# Patient Record
Sex: MALE | Race: OTHER
[De-identification: names, ages, dates, MRNs, and addresses within clinical notes are randomized per-mention and may not be internally consistent; named-entity substitution may affect disease eponyms.]

---

## 2018-08-21 ENCOUNTER — TRANSCRIPTION ENCOUNTER (OUTPATIENT)
Age: 38
End: 2018-08-21

## 2018-08-24 ENCOUNTER — EMERGENCY (EMERGENCY)
Facility: HOSPITAL | Age: 38
LOS: 1 days | Discharge: HOME | End: 2018-08-24
Attending: EMERGENCY MEDICINE
Payer: COMMERCIAL

## 2018-08-24 VITALS
WEIGHT: 199.96 LBS | RESPIRATION RATE: 18 BRPM | HEIGHT: 71 IN | TEMPERATURE: 98 F | SYSTOLIC BLOOD PRESSURE: 120 MMHG | HEART RATE: 64 BPM | OXYGEN SATURATION: 97 % | DIASTOLIC BLOOD PRESSURE: 77 MMHG

## 2018-08-24 PROCEDURE — 99283 EMERGENCY DEPT VISIT LOW MDM: CPT

## 2018-08-24 RX ORDER — AMOXICILLIN 250 MG/5ML
1 SUSPENSION, RECONSTITUTED, ORAL (ML) ORAL
Qty: 10 | Refills: 0 | OUTPATIENT
Start: 2018-08-24 | End: 2018-08-28

## 2018-08-24 RX ORDER — AMOXICILLIN 250 MG/5ML
500 SUSPENSION, RECONSTITUTED, ORAL (ML) ORAL ONCE
Qty: 0 | Refills: 0 | Status: COMPLETED | OUTPATIENT
Start: 2018-08-24 | End: 2018-08-24

## 2018-08-24 RX ADMIN — Medication 500 MILLIGRAM(S): at 13:32

## 2018-08-24 NOTE — ED ADULT TRIAGE NOTE - MODE OF ARRIVAL
11/17/2017  Tomeka Hdez    PROCEDURE PERFORMED:   Colonoscopy with snare polypectomy.     INDICATION FOR PROCEDURE:   History of colon polyps with a previous large adenomatous ascending colon polyp removed by piecemeal snare polypectomy for follow-up    PROCEDURAL DIAGNOSES:   1. Ascending colon polyp 7 mm removed by snare polypectomy  2. Transverse colon polyp sessile 7 mm removed by snare polypectomy  3. Sigmoid diverticulosis.   4. Rectal polyp 5 mm removed by snare polypectomy    MEDICATIONS:   as per Anesthesia    DESCRIPTION OF PROCEDURE:   After risks, benefits and alternatives of procedure, informed consent was obtained. Digital rectal exam was performed and did not reveal any significant abnormalities. The pediatric colonoscope was introduced to the anus and advanced through a fair prep to the cecum. The cecum was identified by ileocecal valve and appendiceal orifice. Instrument was withdrawn as the mucosa was examined. Withdrawal time was 9 minutes.      FINDINGS:   Ascending colon polyp 7 mm removed by snare polypectomy. Transverse colon polyp sessile 7 mm removed by snare polypectomy. Rectal polyp 5 mm removed by snare polypectomy. Cecum, descending and sigmoid colon were normal. There were scattered sigmoid diverticula. Retroflexion in the rectum revealed internal hemorrhoids. Scope was withdrawn, procedure was terminated.     COMPLICATIONS:   None.     ENDOSCOPIC IMPRESSION:   1. Colon polyps removed as above  2. Sigmoid diverticulosis   3. Internal hemorrhoids    RECOMMENDATION:   1. Follow up pathology.   2. High fiber diet, avoid constipation.   3. Repeat surveillance colonoscopy in 3 years depending on the pathology.     Thank you very much for allowing me to participate in the management of this patient.       
Walk in

## 2018-08-24 NOTE — ED PROVIDER NOTE - NS ED ROS FT
CONST: no fevers, no chills  EYES: negative  ENT: +L ear increased yellow discharge, persistent pain and reduced hearing  CV: no chest pain  RESP: no shortness of breath  ABD: no abdominal pain   : no dysuria  MSK: no back pain  NEURO: no headache or additional neurologic complaints  HEME: no easy bleeding  SKIN:  no rash
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
## 2018-08-24 NOTE — ED PROVIDER NOTE - NEUROLOGICAL, MLM
Alert and oriented, no focal deficits, no motor or sensory deficits. +reduced hearing on L CN2-12 otherwise grossly intact.

## 2018-08-24 NOTE — ED PROVIDER NOTE - LEFT EAR
TM PERFORATIONS/TM RED/3-9 oclock perf inferiorly. external canal is mildly erythematous. No tragal/external ear tenderness. No purulent discharge from ear.

## 2018-08-24 NOTE — ED ADULT NURSE NOTE - NSIMPLEMENTINTERV_GEN_ALL_ED
Implemented All Universal Safety Interventions:  [place] to call system. Call bell, personal items and telephone within reach. Instruct patient to call for assistance. Room bathroom lighting operational. Non-slip footwear when patient is off stretcher. Physically safe environment: no spills, clutter or unnecessary equipment. Stretcher in lowest position, wheels locked, appropriate side rails in place.
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
## 2018-08-24 NOTE — ED PROVIDER NOTE - MEDICAL DECISION MAKING DETAILS
38M hx perforated eardrum p/w L ear drainage. Does not appear to be infected. Will give amox for prophylaxis and recommend outpatient ENT f/u. VSS and well appearing. will analgese and d/c home. 38M hx perforated eardrum p/w L ear drainage. Does not appear to be infected. Will give amox for prophylaxis and recommend outpatient ENT f/u. VSS and well appearing. will analgese and d/c home.  Attending Statement: Agree with the above.  L ear TM rupture c reported yellow d/c despite ciprodex gtt.  Rec start amox and ENT f/u.  No evidence of systemic disease or mastoiditis, meningitis, CVST, etc.  --BMM

## 2020-04-25 ENCOUNTER — MESSAGE (OUTPATIENT)
Age: 40
End: 2020-04-25

## 2020-05-02 LAB
SARS-COV-2 IGG SERPL IA-ACNC: 7.1 INDEX
SARS-COV-2 IGG SERPL QL IA: POSITIVE

## 2021-01-22 ENCOUNTER — APPOINTMENT (OUTPATIENT)
Dept: INTERNAL MEDICINE | Facility: CLINIC | Age: 41
End: 2021-01-22
Payer: COMMERCIAL

## 2021-01-22 ENCOUNTER — NON-APPOINTMENT (OUTPATIENT)
Age: 41
End: 2021-01-22

## 2021-01-22 VITALS
BODY MASS INDEX: 29.49 KG/M2 | RESPIRATION RATE: 14 BRPM | TEMPERATURE: 97.6 F | SYSTOLIC BLOOD PRESSURE: 118 MMHG | HEIGHT: 70 IN | WEIGHT: 206 LBS | HEART RATE: 72 BPM | DIASTOLIC BLOOD PRESSURE: 74 MMHG | OXYGEN SATURATION: 96 %

## 2021-01-22 DIAGNOSIS — Z80.3 FAMILY HISTORY OF MALIGNANT NEOPLASM OF BREAST: ICD-10-CM

## 2021-01-22 DIAGNOSIS — Z23 ENCOUNTER FOR IMMUNIZATION: ICD-10-CM

## 2021-01-22 DIAGNOSIS — Z00.00 ENCOUNTER FOR GENERAL ADULT MEDICAL EXAMINATION W/OUT ABNORMAL FINDINGS: ICD-10-CM

## 2021-01-22 DIAGNOSIS — R19.4 CHANGE IN BOWEL HABIT: ICD-10-CM

## 2021-01-22 DIAGNOSIS — N40.0 BENIGN PROSTATIC HYPERPLASIA WITHOUT LOWER URINARY TRACT SYMPMS: ICD-10-CM

## 2021-01-22 DIAGNOSIS — R97.20 BENIGN PROSTATIC HYPERPLASIA WITHOUT LOWER URINARY TRACT SYMPMS: ICD-10-CM

## 2021-01-22 DIAGNOSIS — E78.5 HYPERLIPIDEMIA, UNSPECIFIED: ICD-10-CM

## 2021-01-22 DIAGNOSIS — Z83.3 FAMILY HISTORY OF DIABETES MELLITUS: ICD-10-CM

## 2021-01-22 PROCEDURE — G0442 ANNUAL ALCOHOL SCREEN 15 MIN: CPT | Mod: NC

## 2021-01-22 PROCEDURE — 99386 PREV VISIT NEW AGE 40-64: CPT | Mod: 25

## 2021-01-22 PROCEDURE — G0444 DEPRESSION SCREEN ANNUAL: CPT | Mod: NC,59

## 2021-01-22 PROCEDURE — 36415 COLL VENOUS BLD VENIPUNCTURE: CPT

## 2021-01-22 PROCEDURE — 99072 ADDL SUPL MATRL&STAF TM PHE: CPT

## 2021-01-22 PROCEDURE — 93000 ELECTROCARDIOGRAM COMPLETE: CPT | Mod: 59

## 2021-01-22 RX ORDER — MULTIVIT-MIN/FA/LYCOPEN/LUTEIN .4-300-25
TABLET ORAL
Refills: 0 | Status: ACTIVE | COMMUNITY
Start: 2021-01-22

## 2021-01-22 RX ORDER — OMEGA-3-ACID ETHYL ESTERS CAPSULES 1 G/1
1 CAPSULE, LIQUID FILLED ORAL
Refills: 0 | Status: ACTIVE | COMMUNITY
Start: 2021-01-22

## 2021-01-22 RX ORDER — GLUCOSAMINE SULFATE 500 MG
500 CAPSULE ORAL
Refills: 0 | Status: ACTIVE | COMMUNITY
Start: 2021-01-22

## 2021-01-22 NOTE — HISTORY OF PRESENT ILLNESS
[FreeTextEntry1] : annual well visit  [de-identified] : -PMH: None known\par -SH: .  with Syl. Former smoker (Quit 2008). Occasional EtOH use. \par \par KEEGAN is a 40 year M whom is here today for an annual well check and to establish care w/ a new PMD\par Today, pt reports feeling well but reports that he has been having changes in bowel habits as well as changes in vision that has occurred over the past 2 years. He has an upcoming Optho appointment in 2 weeks\par He denies any changes since our last f/u visit\par with regards to changes in bowel habits, he mentions that he had changes\par \par Specialists Involved:\par None\par \par -Vaccines: All up to date\par -FH of Prostate, Colon, breast or ovarian CA: None known

## 2021-01-22 NOTE — ASSESSMENT
[FreeTextEntry1] : -PMH: None known\par -SH: . [place] with Syl. Former smoker (Quit 2008). Occasional EtOH use. \par \par KEEGAN is a 40 year M whom is here today for an annual well check and to establish care w/ a new PMD\par \par Specialists Involved:\par None\par \par EKG obtained in office today demonstrates NSR. normal axis/Intervals. Good-R-wave progression. Normal EKG. \par \par -F/u labs drawn in office today\par -Further recs pending lab results\par -RTO yearly for CPE or sooner if needed

## 2021-01-22 NOTE — HEALTH RISK ASSESSMENT
[Reviewed no changes] : Reviewed no changes [Very Good] : ~his/her~  mood as very good [6-10] : 6-10 [Yes] : Yes [2 - 4 times a month (2 pts)] : 2-4 times a month (2 points) [1 or 2 (0 pts)] : 1 or 2 (0 points) [Never (0 pts)] : Never (0 points) [No] : In the past 12 months have you used drugs other than those required for medical reasons? No [0] : 2) Feeling down, depressed, or hopeless: Not at all (0) [HIV test declined] : HIV test declined [Hepatitis C test declined] : Hepatitis C test declined [With Family] : lives with family [] :  [# Of Children ___] : has [unfilled] children [] : No [YearQuit] : 2008 [Audit-CScore] : 1 [[id number]] : 0 [Change in mental status noted] : No change in mental status noted [Sexually Active] : not sexually active [AdvancecareDate] : 01/21

## 2021-01-25 LAB
ALBUMIN SERPL ELPH-MCNC: 4.7 G/DL
ALP BLD-CCNC: 54 U/L
ALT SERPL-CCNC: 22 U/L
ANION GAP SERPL CALC-SCNC: 14 MMOL/L
AST SERPL-CCNC: 29 U/L
BASOPHILS # BLD AUTO: 0.04 K/UL
BASOPHILS NFR BLD AUTO: 0.4 %
BILIRUB SERPL-MCNC: 1.2 MG/DL
BUN SERPL-MCNC: 17 MG/DL
CALCIUM SERPL-MCNC: 9.1 MG/DL
CHLORIDE SERPL-SCNC: 101 MMOL/L
CHOLEST SERPL-MCNC: 157 MG/DL
CO2 SERPL-SCNC: 24 MMOL/L
CREAT SERPL-MCNC: 1.21 MG/DL
EOSINOPHIL # BLD AUTO: 0.15 K/UL
EOSINOPHIL NFR BLD AUTO: 1.5 %
GLUCOSE SERPL-MCNC: 95 MG/DL
HCT VFR BLD CALC: 46.2 %
HDLC SERPL-MCNC: 36 MG/DL
HGB BLD-MCNC: 15.4 G/DL
IMM GRANULOCYTES NFR BLD AUTO: 0.5 %
LDLC SERPL CALC-MCNC: 99 MG/DL
LYMPHOCYTES # BLD AUTO: 3.65 K/UL
LYMPHOCYTES NFR BLD AUTO: 35.4 %
MAN DIFF?: NORMAL
MCHC RBC-ENTMCNC: 30.4 PG
MCHC RBC-ENTMCNC: 33.3 GM/DL
MCV RBC AUTO: 91.3 FL
MONOCYTES # BLD AUTO: 0.63 K/UL
MONOCYTES NFR BLD AUTO: 6.1 %
NEUTROPHILS # BLD AUTO: 5.8 K/UL
NEUTROPHILS NFR BLD AUTO: 56.1 %
NONHDLC SERPL-MCNC: 121 MG/DL
PLATELET # BLD AUTO: 272 K/UL
POTASSIUM SERPL-SCNC: 4.1 MMOL/L
PROT SERPL-MCNC: 7.3 G/DL
PSA SERPL-MCNC: 2.37 NG/ML
RBC # BLD: 5.06 M/UL
RBC # FLD: 12.1 %
SODIUM SERPL-SCNC: 138 MMOL/L
TRIGL SERPL-MCNC: 110 MG/DL
WBC # FLD AUTO: 10.32 K/UL

## 2021-01-28 ENCOUNTER — NON-APPOINTMENT (OUTPATIENT)
Age: 41
End: 2021-01-28

## 2021-02-02 ENCOUNTER — APPOINTMENT (OUTPATIENT)
Dept: OPHTHALMOLOGY | Facility: CLINIC | Age: 41
End: 2021-02-02

## 2021-02-23 ENCOUNTER — APPOINTMENT (OUTPATIENT)
Dept: OPHTHALMOLOGY | Facility: CLINIC | Age: 41
End: 2021-02-23
Payer: COMMERCIAL

## 2021-02-23 ENCOUNTER — NON-APPOINTMENT (OUTPATIENT)
Age: 41
End: 2021-02-23

## 2021-02-23 PROCEDURE — 99072 ADDL SUPL MATRL&STAF TM PHE: CPT

## 2021-02-23 PROCEDURE — 92285 EXTERNAL OCULAR PHOTOGRAPHY: CPT

## 2021-02-23 PROCEDURE — 92025 CPTRIZED CORNEAL TOPOGRAPHY: CPT

## 2021-02-23 PROCEDURE — 92004 COMPRE OPH EXAM NEW PT 1/>: CPT
